# Patient Record
Sex: FEMALE | URBAN - METROPOLITAN AREA
[De-identification: names, ages, dates, MRNs, and addresses within clinical notes are randomized per-mention and may not be internally consistent; named-entity substitution may affect disease eponyms.]

---

## 2020-08-13 ENCOUNTER — HOSPITAL ENCOUNTER (OUTPATIENT)
Dept: ULTRASOUND IMAGING | Facility: CLINIC | Age: 35
End: 2020-08-13
Attending: ADVANCED PRACTICE MIDWIFE
Payer: COMMERCIAL

## 2020-08-13 ENCOUNTER — TRANSCRIBE ORDERS (OUTPATIENT)
Dept: MATERNAL FETAL MEDICINE | Facility: CLINIC | Age: 35
End: 2020-08-13

## 2020-08-13 ENCOUNTER — OFFICE VISIT (OUTPATIENT)
Dept: MATERNAL FETAL MEDICINE | Facility: CLINIC | Age: 35
End: 2020-08-13
Attending: ADVANCED PRACTICE MIDWIFE
Payer: COMMERCIAL

## 2020-08-13 ENCOUNTER — PRE VISIT (OUTPATIENT)
Dept: MATERNAL FETAL MEDICINE | Facility: CLINIC | Age: 35
End: 2020-08-13

## 2020-08-13 DIAGNOSIS — O26.90 PREGNANCY RELATED CONDITION, ANTEPARTUM: ICD-10-CM

## 2020-08-13 DIAGNOSIS — O26.90 PREGNANCY RELATED CONDITION, ANTEPARTUM: Primary | ICD-10-CM

## 2020-08-13 PROCEDURE — 76811 OB US DETAILED SNGL FETUS: CPT

## 2020-08-16 NOTE — PROGRESS NOTES
Please see ultrasound report under Imaging tab for details of today's ultrasound.    Chen Varner MD  Maternal-Fetal Medicine